# Patient Record
Sex: MALE | Race: OTHER | NOT HISPANIC OR LATINO | Employment: FULL TIME | URBAN - METROPOLITAN AREA
[De-identification: names, ages, dates, MRNs, and addresses within clinical notes are randomized per-mention and may not be internally consistent; named-entity substitution may affect disease eponyms.]

---

## 2024-03-26 ENCOUNTER — OFFICE VISIT (OUTPATIENT)
Dept: OPHTHALMOLOGY | Facility: CLINIC | Age: 34
End: 2024-03-26
Payer: COMMERCIAL

## 2024-03-26 DIAGNOSIS — L03.213 PRESEPTAL CELLULITIS: Primary | ICD-10-CM

## 2024-03-26 PROBLEM — Z97.3 WEARS GLASSES: Status: ACTIVE | Noted: 2024-03-26

## 2024-03-26 PROCEDURE — 99203 OFFICE O/P NEW LOW 30 MIN: CPT | Performed by: OPTOMETRIST

## 2024-03-26 RX ORDER — AMOXICILLIN AND CLAVULANATE POTASSIUM 600; 42.9 MG/5ML; MG/5ML
600 POWDER, FOR SUSPENSION ORAL EVERY 12 HOURS SCHEDULED
COMMUNITY
End: 2024-03-26 | Stop reason: WASHOUT

## 2024-03-26 ASSESSMENT — CONF VISUAL FIELD
OD_INFERIOR_NASAL_RESTRICTION: 0
OD_INFERIOR_TEMPORAL_RESTRICTION: 0
OS_INFERIOR_NASAL_RESTRICTION: 0
OD_NORMAL: 1
OD_SUPERIOR_TEMPORAL_RESTRICTION: 0
OD_SUPERIOR_NASAL_RESTRICTION: 0
OS_SUPERIOR_TEMPORAL_RESTRICTION: 0
OS_NORMAL: 1
OS_INFERIOR_TEMPORAL_RESTRICTION: 0
OS_SUPERIOR_NASAL_RESTRICTION: 0

## 2024-03-26 ASSESSMENT — SLIT LAMP EXAM - LIDS: COMMENTS: NORMAL

## 2024-03-26 ASSESSMENT — ENCOUNTER SYMPTOMS
NEUROLOGICAL NEGATIVE: 0
ALLERGIC/IMMUNOLOGIC NEGATIVE: 0
PSYCHIATRIC NEGATIVE: 0
EYES NEGATIVE: 1
GASTROINTESTINAL NEGATIVE: 0
HEMATOLOGIC/LYMPHATIC NEGATIVE: 0
CONSTITUTIONAL NEGATIVE: 0
CARDIOVASCULAR NEGATIVE: 0
RESPIRATORY NEGATIVE: 0
ENDOCRINE NEGATIVE: 0
MUSCULOSKELETAL NEGATIVE: 0

## 2024-03-26 ASSESSMENT — EXTERNAL EXAM - RIGHT EYE: OD_EXAM: NORMAL

## 2024-03-26 ASSESSMENT — TONOMETRY
OD_IOP_MMHG: 16
IOP_METHOD: TONOPEN
OS_IOP_MMHG: 17

## 2024-03-26 ASSESSMENT — VISUAL ACUITY
METHOD: SNELLEN - LINEAR
OD_CC: 20/20
OS_CC: 20/20

## 2024-03-26 ASSESSMENT — EXTERNAL EXAM - LEFT EYE: OS_EXAM: NORMAL

## 2024-03-26 NOTE — PROGRESS NOTES
Assessment/Plan   Diagnoses and all orders for this visit:  Preseptal cellulitis  - Recommend Augmentin 875mg po BID for 14 days  - Recommend AT QID  - Recommend WC when inflammation has subsided  - Recommend lid scrubs  - Return precautions given for changes in vision, increased redness/tenderness/swelling, or pain